# Patient Record
Sex: FEMALE | Race: WHITE | NOT HISPANIC OR LATINO | Employment: OTHER | URBAN - METROPOLITAN AREA
[De-identification: names, ages, dates, MRNs, and addresses within clinical notes are randomized per-mention and may not be internally consistent; named-entity substitution may affect disease eponyms.]

---

## 2017-11-03 ENCOUNTER — PATIENT OUTREACH (OUTPATIENT)
Dept: HEALTH INFORMATION MANAGEMENT | Facility: OTHER | Age: 72
End: 2017-11-03

## 2017-11-03 NOTE — PROGRESS NOTES
Attempt #:1    Verify PCP: yes    Communication Preference Obtained: yes     Review Care Team: yes    Annual Wellness Visit Scheduling  1. Scheduling Status:Scheduled        Care Gap Scheduling (Attempt to Schedule EACH Overdue Care Gap!)     Health Maintenance Due   Topic Date Due   • DIABETES MONOFILAMENT / LE EXAM  02/01/2017   • RETINAL SCREENING  04/06/2017        Scheduled patient for Annual Wellness Visit       [email or web address] Activation: already active  Diagnose[employer] Meghan: no  Virtual Visits: no  Opt In to Text Messages: no

## 2018-07-27 PROBLEM — R01.1 SYSTOLIC MURMUR: Status: ACTIVE | Noted: 2018-07-27

## 2018-08-02 PROBLEM — G60.9 IDIOPATHIC PERIPHERAL NEUROPATHY: Status: ACTIVE | Noted: 2018-08-02

## 2018-12-28 PROBLEM — R01.1 SYSTOLIC MURMUR: Status: RESOLVED | Noted: 2018-07-27 | Resolved: 2018-12-28

## 2019-01-18 PROBLEM — R60.9 PERIPHERAL EDEMA: Status: ACTIVE | Noted: 2019-01-18

## 2019-04-26 PROBLEM — K21.00 GASTROESOPHAGEAL REFLUX DISEASE WITH ESOPHAGITIS: Status: ACTIVE | Noted: 2019-04-26

## 2019-08-09 PROBLEM — J45.21 INTERMITTENT ASTHMA WITH ACUTE EXACERBATION: Status: ACTIVE | Noted: 2019-08-09

## 2019-09-03 PROBLEM — I89.0 LYMPHEDEMA OF BOTH LOWER EXTREMITIES: Status: ACTIVE | Noted: 2019-09-03

## 2019-09-03 PROBLEM — E66.1: Status: ACTIVE | Noted: 2019-09-03

## 2019-10-21 PROBLEM — E66.01 CLASS 3 SEVERE OBESITY DUE TO EXCESS CALORIES WITH SERIOUS COMORBIDITY IN ADULT (HCC): Status: ACTIVE | Noted: 2019-10-21

## 2019-11-15 PROBLEM — R45.4 IRRITABILITY: Status: ACTIVE | Noted: 2019-11-15

## 2019-11-15 PROBLEM — F32.A DEPRESSION: Status: ACTIVE | Noted: 2019-11-15

## 2020-04-01 PROBLEM — F32.9 MAJOR DEPRESSION: Status: ACTIVE | Noted: 2019-11-15

## 2020-06-02 PROBLEM — J45.991 COUGH VARIANT ASTHMA: Status: ACTIVE | Noted: 2020-06-02

## 2020-10-17 PROBLEM — R16.1 SPLENOMEGALY: Status: ACTIVE | Noted: 2020-10-17

## 2020-10-17 PROBLEM — K76.0 HEPATIC STEATOSIS: Status: ACTIVE | Noted: 2020-10-17

## 2021-01-12 DIAGNOSIS — Z23 NEED FOR VACCINATION: ICD-10-CM

## 2021-05-05 PROBLEM — F45.0 SOMATIZATION DISORDER: Status: ACTIVE | Noted: 2021-05-05

## 2021-12-15 ENCOUNTER — TELEPHONE (OUTPATIENT)
Dept: HEALTH INFORMATION MANAGEMENT | Facility: OTHER | Age: 76
End: 2021-12-15

## 2023-07-31 PROBLEM — R60.9 LYMPHEDEMA DUE TO LIPEDEMA: Status: ACTIVE | Noted: 2019-09-03
